# Patient Record
Sex: FEMALE | Race: OTHER | NOT HISPANIC OR LATINO | URBAN - METROPOLITAN AREA
[De-identification: names, ages, dates, MRNs, and addresses within clinical notes are randomized per-mention and may not be internally consistent; named-entity substitution may affect disease eponyms.]

---

## 2022-10-19 ENCOUNTER — APPOINTMENT (RX ONLY)
Dept: URBAN - METROPOLITAN AREA CLINIC 319 | Facility: CLINIC | Age: 27
Setting detail: DERMATOLOGY
End: 2022-10-19

## 2022-10-19 DIAGNOSIS — L72.8 OTHER FOLLICULAR CYSTS OF THE SKIN AND SUBCUTANEOUS TISSUE: ICD-10-CM

## 2022-10-19 PROCEDURE — ? COUNSELING

## 2022-10-19 PROCEDURE — 99202 OFFICE O/P NEW SF 15 MIN: CPT

## 2022-10-19 ASSESSMENT — LOCATION ZONE DERM: LOCATION ZONE: FACE

## 2022-10-19 ASSESSMENT — LOCATION DETAILED DESCRIPTION DERM: LOCATION DETAILED: RIGHT FOREHEAD

## 2022-10-19 ASSESSMENT — LOCATION SIMPLE DESCRIPTION DERM: LOCATION SIMPLE: RIGHT FOREHEAD

## 2022-10-24 ENCOUNTER — APPOINTMENT (RX ONLY)
Dept: URBAN - METROPOLITAN AREA CLINIC 319 | Facility: CLINIC | Age: 27
Setting detail: DERMATOLOGY
End: 2022-10-24

## 2022-10-24 DIAGNOSIS — D485 NEOPLASM OF UNCERTAIN BEHAVIOR OF SKIN: ICD-10-CM | Status: INADEQUATELY CONTROLLED

## 2022-10-24 PROBLEM — D48.5 NEOPLASM OF UNCERTAIN BEHAVIOR OF SKIN: Status: ACTIVE | Noted: 2022-10-24

## 2022-10-24 PROCEDURE — ? EXCISION

## 2022-10-24 PROCEDURE — 11442 EXC FACE-MM B9+MARG 1.1-2 CM: CPT

## 2022-10-24 PROCEDURE — 12051 INTMD RPR FACE/MM 2.5 CM/<: CPT

## 2022-10-24 ASSESSMENT — LOCATION DETAILED DESCRIPTION DERM: LOCATION DETAILED: RIGHT FOREHEAD

## 2022-10-24 ASSESSMENT — LOCATION SIMPLE DESCRIPTION DERM: LOCATION SIMPLE: RIGHT FOREHEAD

## 2022-10-24 ASSESSMENT — LOCATION ZONE DERM: LOCATION ZONE: FACE

## 2022-10-24 NOTE — PROCEDURE: EXCISION
Path Notes (To The Dermatopathologist): Clinically resembled cyst, however, after incision, noted it was not cyst and more consistent with a vascular ectasia/dilation, pt wanted it removed

## 2022-10-24 NOTE — HPI: CYST
How Severe Is Your Cyst?: moderate
Is This A New Presentation, Or A Follow-Up?: Cyst
Additional History: Cyst on left temple

## 2022-10-31 ENCOUNTER — APPOINTMENT (RX ONLY)
Dept: URBAN - METROPOLITAN AREA CLINIC 319 | Facility: CLINIC | Age: 27
Setting detail: DERMATOLOGY
End: 2022-10-31

## 2022-10-31 DIAGNOSIS — Z48.02 ENCOUNTER FOR REMOVAL OF SUTURES: ICD-10-CM | Status: WELL CONTROLLED

## 2022-10-31 PROCEDURE — ? COUNSELING

## 2022-10-31 PROCEDURE — 99024 POSTOP FOLLOW-UP VISIT: CPT

## 2022-10-31 PROCEDURE — ? TREATMENT REGIMEN

## 2022-10-31 PROCEDURE — ? SUTURE REMOVAL (GLOBAL PERIOD)

## 2022-10-31 ASSESSMENT — LOCATION DETAILED DESCRIPTION DERM: LOCATION DETAILED: RIGHT SUPERIOR FOREHEAD

## 2022-10-31 ASSESSMENT — PAIN INTENSITY VAS: HOW INTENSE IS YOUR PAIN 0 BEING NO PAIN, 10 BEING THE MOST SEVERE PAIN POSSIBLE?: 6/10 PAIN

## 2022-10-31 ASSESSMENT — LOCATION SIMPLE DESCRIPTION DERM: LOCATION SIMPLE: RIGHT FOREHEAD

## 2022-10-31 ASSESSMENT — LOCATION ZONE DERM: LOCATION ZONE: FACE

## 2022-10-31 NOTE — PROCEDURE: SUTURE REMOVAL (GLOBAL PERIOD)
Detail Level: Detailed
Add 80744 Cpt? (Important Note: In 2017 The Use Of 00103 Is Being Tracked By Cms To Determine Future Global Period Reimbursement For Global Periods): yes

## 2022-10-31 NOTE — PROCEDURE: COUNSELING
Detail Level: Detailed
Patient Specific Counseling (Will Not Stick From Patient To Patient): Patient still experiencing pain. She describes pain at the site but also on the left side and posterior side. It starts 5-6 hours after waking, and may be worse with looking at screens. She did not have any headaches prior.\\n\\nDiscussed that today it looks clean, in tact, no sign of hematoma, infection, or excessive inflammation.\\nDiscussed that sometimes the sutures can cause discomfort or inflammation to surrounding cutaneous nerves. This may be a cause. If so, should get better within the next month. Discussed top sutures were removed today which can sometimes cause irritation, but there are deep sutures that will dissolve over the next 1-2 months.\\n\\nDiscussed that it would not fully explain the pain on the left and posterior side of her head. Discussed that could be from her carrying her head differently and using different muscles than normal given the pain on the right side.\\n\\nAgain, recommended acetaminophen 1000 mg q8h alternating with ibuprofen. She is not taking recommended amount of acetaminophen at this time. \\n\\nShe denies any trauma to this location of the head. At prior visit, she made several comments that she was clumsy and has fallen, ran into doors, and fallen down stairs. She denies any trauma to this specific area that she can remember. Discussed that typically these ectasias occur from trauma. Discussed she could see her a general practioner given that there was clot identified to ensure no underlying thrombosis disorder. She is not on birth control anymore (stopped about 1 month ago). Discussed the clot identify can occur in the venous ectasias without underlying clot disorder, but since she is young, a discussion with general practioner is recommended.\\n\\nWill follow-up with us in 2 weeks to see if it is improving. If not, may need follow-up with PCP to look at other etiologies of headaches (like tension headache given it comes on after 5-6 hours of working).

## 2022-10-31 NOTE — PROCEDURE: TREATMENT REGIMEN
Continue Regimen: Tylenol and ibuprofen
Detail Level: Generalized
Plan: If pts is still in pain after a month pt to follow up with pcp